# Patient Record
Sex: MALE | Race: WHITE | URBAN - METROPOLITAN AREA
[De-identification: names, ages, dates, MRNs, and addresses within clinical notes are randomized per-mention and may not be internally consistent; named-entity substitution may affect disease eponyms.]

---

## 2017-09-19 ENCOUNTER — APPOINTMENT (RX ONLY)
Dept: URBAN - METROPOLITAN AREA CLINIC 344 | Facility: CLINIC | Age: 72
Setting detail: DERMATOLOGY
End: 2017-09-19

## 2017-09-19 DIAGNOSIS — B35.3 TINEA PEDIS: ICD-10-CM

## 2017-09-19 PROCEDURE — ? TREATMENT REGIMEN

## 2017-09-19 PROCEDURE — 99202 OFFICE O/P NEW SF 15 MIN: CPT

## 2017-09-19 PROCEDURE — ? COUNSELING

## 2017-09-19 ASSESSMENT — LOCATION SIMPLE DESCRIPTION DERM
LOCATION SIMPLE: RIGHT FOOT
LOCATION SIMPLE: RIGHT PLANTAR SURFACE
LOCATION SIMPLE: LEFT FOOT

## 2017-09-19 ASSESSMENT — LOCATION DETAILED DESCRIPTION DERM
LOCATION DETAILED: RIGHT INSTEP
LOCATION DETAILED: LEFT DORSAL FOOT
LOCATION DETAILED: RIGHT DORSAL FOOT

## 2017-09-19 ASSESSMENT — LOCATION ZONE DERM: LOCATION ZONE: FEET

## 2017-09-19 NOTE — PROCEDURE: TREATMENT REGIMEN
Notification: Please note that there are new Treatment Regimen plans which have an enhanced patient education handout experience.  The plans are called Treatment Regimen (Acne), Treatment Regimen (Atopic Dermatitis), Treatment Regimen (Rosacea), Treatment Regimen (Sun Protection), Treatment Regimen (Cosmetic Products), and Treatment Regimen (Xerosis).
Initiate Treatment: Naftin gel: apply to affected areas twice daily.  Apply liberal amount to feet and wrap at bedtime.  Patient has Rx at home
Continue Regimen: Urea 45% cream: apply to feet once daily, or as needed
Detail Level: Zone

## 2017-10-03 ENCOUNTER — APPOINTMENT (RX ONLY)
Dept: URBAN - METROPOLITAN AREA CLINIC 344 | Facility: CLINIC | Age: 72
Setting detail: DERMATOLOGY
End: 2017-10-03

## 2017-10-03 DIAGNOSIS — B35.3 TINEA PEDIS: ICD-10-CM | Status: IMPROVED

## 2017-10-03 DIAGNOSIS — B35.4 TINEA CORPORIS: ICD-10-CM

## 2017-10-03 PROCEDURE — ? COUNSELING

## 2017-10-03 PROCEDURE — 99213 OFFICE O/P EST LOW 20 MIN: CPT

## 2017-10-03 PROCEDURE — ? TREATMENT REGIMEN

## 2017-10-03 ASSESSMENT — LOCATION ZONE DERM
LOCATION ZONE: FEET
LOCATION ZONE: LEG

## 2017-10-03 ASSESSMENT — LOCATION DETAILED DESCRIPTION DERM
LOCATION DETAILED: RIGHT ANTERIOR PROXIMAL THIGH
LOCATION DETAILED: RIGHT INSTEP
LOCATION DETAILED: RIGHT DORSAL FOOT
LOCATION DETAILED: LEFT DORSAL FOOT

## 2017-10-03 ASSESSMENT — LOCATION SIMPLE DESCRIPTION DERM
LOCATION SIMPLE: RIGHT PLANTAR SURFACE
LOCATION SIMPLE: LEFT FOOT
LOCATION SIMPLE: RIGHT THIGH
LOCATION SIMPLE: RIGHT FOOT

## 2017-10-03 NOTE — PROCEDURE: TREATMENT REGIMEN
Detail Level: Zone
Initiate Treatment: Naftin gel: apply to affected areas twice daily
Notification: Please note that there are new Treatment Regimen plans which have an enhanced patient education handout experience.  The plans are called Treatment Regimen (Acne), Treatment Regimen (Atopic Dermatitis), Treatment Regimen (Rosacea), Treatment Regimen (Sun Protection), Treatment Regimen (Cosmetic Products), and Treatment Regimen (Xerosis).
Continue Regimen: Naftin gel: apply to affected areas twice daily.  Apply liberal amount to feet and wrap at bedtime.  \\nUrea 45% cream: apply to feet once daily, or as needed

## 2018-09-18 ENCOUNTER — RX ONLY (OUTPATIENT)
Age: 73
Setting detail: RX ONLY
End: 2018-09-18

## 2018-09-18 RX ORDER — UREA 40 G/100G
CREAM TOPICAL
Qty: 1 | Refills: 0 | Status: ERX

## 2019-03-19 ENCOUNTER — RX ONLY (OUTPATIENT)
Age: 74
Setting detail: RX ONLY
End: 2019-03-19

## 2019-03-19 ENCOUNTER — APPOINTMENT (RX ONLY)
Dept: URBAN - METROPOLITAN AREA CLINIC 344 | Facility: CLINIC | Age: 74
Setting detail: DERMATOLOGY
End: 2019-03-19

## 2019-03-19 DIAGNOSIS — L82.1 OTHER SEBORRHEIC KERATOSIS: ICD-10-CM

## 2019-03-19 DIAGNOSIS — L81.4 OTHER MELANIN HYPERPIGMENTATION: ICD-10-CM

## 2019-03-19 DIAGNOSIS — D18.0 HEMANGIOMA: ICD-10-CM

## 2019-03-19 DIAGNOSIS — B35.3 TINEA PEDIS: ICD-10-CM | Status: WELL CONTROLLED

## 2019-03-19 DIAGNOSIS — D22 MELANOCYTIC NEVI: ICD-10-CM

## 2019-03-19 DIAGNOSIS — L85.3 XEROSIS CUTIS: ICD-10-CM

## 2019-03-19 PROBLEM — K21.9 GASTRO-ESOPHAGEAL REFLUX DISEASE WITHOUT ESOPHAGITIS: Status: ACTIVE | Noted: 2019-03-19

## 2019-03-19 PROBLEM — D22.5 MELANOCYTIC NEVI OF TRUNK: Status: ACTIVE | Noted: 2019-03-19

## 2019-03-19 PROBLEM — J45.909 UNSPECIFIED ASTHMA, UNCOMPLICATED: Status: ACTIVE | Noted: 2019-03-19

## 2019-03-19 PROBLEM — D18.01 HEMANGIOMA OF SKIN AND SUBCUTANEOUS TISSUE: Status: ACTIVE | Noted: 2019-03-19

## 2019-03-19 PROCEDURE — ? OTHER

## 2019-03-19 PROCEDURE — 99213 OFFICE O/P EST LOW 20 MIN: CPT

## 2019-03-19 PROCEDURE — ? ADDITIONAL NOTES

## 2019-03-19 PROCEDURE — ? TREATMENT REGIMEN

## 2019-03-19 PROCEDURE — ? COUNSELING

## 2019-03-19 RX ORDER — NAFTIFINE HYDROCHLORIDE 2 G/100G
GEL TOPICAL
Qty: 1 | Refills: 1 | Status: ERX

## 2019-03-19 ASSESSMENT — LOCATION ZONE DERM
LOCATION ZONE: TRUNK
LOCATION ZONE: FEET

## 2019-03-19 ASSESSMENT — LOCATION DETAILED DESCRIPTION DERM
LOCATION DETAILED: RIGHT INFERIOR UPPER BACK
LOCATION DETAILED: LEFT PLANTAR FOREFOOT OVERLYING 3RD METATARSAL
LOCATION DETAILED: RIGHT SUPERIOR MEDIAL LOWER BACK
LOCATION DETAILED: RIGHT PLANTAR FOREFOOT OVERLYING 3RD METATARSAL
LOCATION DETAILED: RIGHT INFERIOR MEDIAL MIDBACK
LOCATION DETAILED: RIGHT MID-UPPER BACK

## 2019-03-19 ASSESSMENT — LOCATION SIMPLE DESCRIPTION DERM
LOCATION SIMPLE: RIGHT PLANTAR SURFACE
LOCATION SIMPLE: RIGHT LOWER BACK
LOCATION SIMPLE: RIGHT UPPER BACK
LOCATION SIMPLE: LEFT PLANTAR SURFACE

## 2019-03-19 NOTE — PROCEDURE: TREATMENT REGIMEN
Discontinue Regimen: Dial soap
Detail Level: Zone
Continue Regimen: Naftin cream and Cerave foot and heel cream
Plan: [place] cooler showers\\nGentle soaps for washing
Detail Level: Generalized
Samples Given: Moisturize daily \\nEucerine body cream and wash recommended

## 2019-03-19 NOTE — PROCEDURE: OTHER
Detail Level: Simple
Note Text (......Xxx Chief Complaint.): This diagnosis correlates with the
Other (Free Text): Right cheek , reassurance given

## 2019-12-23 ENCOUNTER — APPOINTMENT (RX ONLY)
Dept: URBAN - METROPOLITAN AREA CLINIC 344 | Facility: CLINIC | Age: 74
Setting detail: DERMATOLOGY
End: 2019-12-23

## 2019-12-23 DIAGNOSIS — B35.4 TINEA CORPORIS: ICD-10-CM

## 2019-12-23 DIAGNOSIS — L82.1 OTHER SEBORRHEIC KERATOSIS: ICD-10-CM

## 2019-12-23 DIAGNOSIS — L24 IRRITANT CONTACT DERMATITIS: ICD-10-CM

## 2019-12-23 PROBLEM — L24.9 IRRITANT CONTACT DERMATITIS, UNSPECIFIED CAUSE: Status: ACTIVE | Noted: 2019-12-23

## 2019-12-23 PROCEDURE — ? TREATMENT REGIMEN

## 2019-12-23 PROCEDURE — ? PRESCRIPTION

## 2019-12-23 PROCEDURE — ? COUNSELING

## 2019-12-23 PROCEDURE — 99213 OFFICE O/P EST LOW 20 MIN: CPT

## 2019-12-23 PROCEDURE — ? DEFER

## 2019-12-23 RX ORDER — CLOTRIMAZOLE AND BETAMETHASONE DIPROPIONATE 10; .5 MG/G; MG/G
CREAM TOPICAL
Qty: 1 | Refills: 6 | Status: ERX | COMMUNITY
Start: 2019-12-23

## 2019-12-23 RX ORDER — TRIAMCINOLONE ACETONIDE 1 MG/G
CREAM TOPICAL
Qty: 1 | Refills: 2 | Status: ERX | COMMUNITY
Start: 2019-12-23

## 2019-12-23 RX ADMIN — TRIAMCINOLONE ACETONIDE: 1 CREAM TOPICAL at 00:00

## 2019-12-23 RX ADMIN — CLOTRIMAZOLE AND BETAMETHASONE DIPROPIONATE: 10; .5 CREAM TOPICAL at 00:00

## 2019-12-23 ASSESSMENT — LOCATION SIMPLE DESCRIPTION DERM
LOCATION SIMPLE: RIGHT FOREARM
LOCATION SIMPLE: GROIN
LOCATION SIMPLE: RIGHT INGUINAL FOLD
LOCATION SIMPLE: LEFT FOREARM

## 2019-12-23 ASSESSMENT — LOCATION DETAILED DESCRIPTION DERM
LOCATION DETAILED: RIGHT PROXIMAL DORSAL FOREARM
LOCATION DETAILED: LEFT SUPRAPUBIC SKIN
LOCATION DETAILED: LEFT PROXIMAL DORSAL FOREARM
LOCATION DETAILED: RIGHT INGUINAL FOLD
LOCATION DETAILED: RIGHT SUPRAPUBIC SKIN

## 2019-12-23 ASSESSMENT — LOCATION ZONE DERM
LOCATION ZONE: ARM
LOCATION ZONE: LEG
LOCATION ZONE: TRUNK

## 2019-12-23 NOTE — PROCEDURE: TREATMENT REGIMEN
Detail Level: Zone
Continue Regimen: Clobetasol diproprinate - apply to affected areas every morning
Initiate Treatment: Triamcinolone cream- apply twice daily for two weeks \\nFragrance free detergents \\nSensitive skin lotions
Initiate Treatment: Triple paste- Apple to affected areas at night

## 2019-12-23 NOTE — PROCEDURE: DEFER
Introduction Text (Please End With A Colon): The following procedure was deferred: topical field treatment
Detail Level: Detailed

## 2020-01-29 ENCOUNTER — APPOINTMENT (RX ONLY)
Dept: URBAN - METROPOLITAN AREA CLINIC 347 | Facility: CLINIC | Age: 75
Setting detail: DERMATOLOGY
End: 2020-01-29

## 2020-01-29 DIAGNOSIS — L82.0 INFLAMED SEBORRHEIC KERATOSIS: ICD-10-CM

## 2020-01-29 DIAGNOSIS — B35.4 TINEA CORPORIS: ICD-10-CM | Status: IMPROVED

## 2020-01-29 DIAGNOSIS — B35.3 TINEA PEDIS: ICD-10-CM | Status: IMPROVED

## 2020-01-29 PROBLEM — I10 ESSENTIAL (PRIMARY) HYPERTENSION: Status: ACTIVE | Noted: 2020-01-29

## 2020-01-29 PROCEDURE — 17110 DESTRUCTION B9 LES UP TO 14: CPT

## 2020-01-29 PROCEDURE — ? LIQUID NITROGEN

## 2020-01-29 PROCEDURE — ? TREATMENT REGIMEN

## 2020-01-29 PROCEDURE — ? PRESCRIPTION

## 2020-01-29 PROCEDURE — ? COUNSELING

## 2020-01-29 PROCEDURE — 99213 OFFICE O/P EST LOW 20 MIN: CPT | Mod: 25

## 2020-01-29 RX ORDER — NAFTIFINE HYDROCHLORIDE 2 G/100G
GEL TOPICAL
Qty: 1 | Refills: 3 | Status: ERX | COMMUNITY
Start: 2020-01-29

## 2020-01-29 RX ADMIN — NAFTIFINE HYDROCHLORIDE: 2 GEL TOPICAL at 00:00

## 2020-01-29 ASSESSMENT — LOCATION ZONE DERM
LOCATION ZONE: FEET
LOCATION ZONE: LEG
LOCATION ZONE: TRUNK

## 2020-01-29 ASSESSMENT — LOCATION SIMPLE DESCRIPTION DERM
LOCATION SIMPLE: LEFT THIGH
LOCATION SIMPLE: RIGHT FOOT
LOCATION SIMPLE: LEFT FOOT
LOCATION SIMPLE: GROIN

## 2020-01-29 ASSESSMENT — LOCATION DETAILED DESCRIPTION DERM
LOCATION DETAILED: LEFT DORSAL FOOT
LOCATION DETAILED: SUPRAPUBIC SKIN
LOCATION DETAILED: LEFT ANTERIOR MEDIAL PROXIMAL THIGH
LOCATION DETAILED: RIGHT DORSAL FOOT

## 2020-01-29 NOTE — PROCEDURE: TREATMENT REGIMEN
Detail Level: Zone
Continue Regimen: Naftin gel each night
Continue Regimen: Clotrimazole - Betamethasone daily as needed for flares

## 2020-08-14 ENCOUNTER — APPOINTMENT (RX ONLY)
Dept: URBAN - METROPOLITAN AREA CLINIC 347 | Facility: CLINIC | Age: 75
Setting detail: DERMATOLOGY
End: 2020-08-14

## 2020-08-14 DIAGNOSIS — L82.1 OTHER SEBORRHEIC KERATOSIS: ICD-10-CM

## 2020-08-14 DIAGNOSIS — B35.4 TINEA CORPORIS: ICD-10-CM | Status: WELL CONTROLLED

## 2020-08-14 PROCEDURE — 99213 OFFICE O/P EST LOW 20 MIN: CPT

## 2020-08-14 PROCEDURE — ? COUNSELING

## 2020-08-14 PROCEDURE — ? TREATMENT REGIMEN

## 2020-08-14 ASSESSMENT — LOCATION DETAILED DESCRIPTION DERM
LOCATION DETAILED: LEFT DISTAL POSTERIOR UPPER ARM
LOCATION DETAILED: LEFT SUPERIOR MEDIAL MALAR CHEEK

## 2020-08-14 ASSESSMENT — LOCATION SIMPLE DESCRIPTION DERM
LOCATION SIMPLE: LEFT UPPER ARM
LOCATION SIMPLE: LEFT CHEEK

## 2020-08-14 ASSESSMENT — LOCATION ZONE DERM
LOCATION ZONE: FACE
LOCATION ZONE: ARM

## 2020-08-14 NOTE — PROCEDURE: TREATMENT REGIMEN
Continue Regimen: Zeasorb powder every morning \\nTripple paste AF at night \\nClotrimazole / Betamethasone only as needed for flares
Detail Level: Zone

## 2021-03-03 ENCOUNTER — APPOINTMENT (RX ONLY)
Dept: URBAN - METROPOLITAN AREA CLINIC 347 | Facility: CLINIC | Age: 76
Setting detail: DERMATOLOGY
End: 2021-03-03

## 2021-03-03 DIAGNOSIS — L20.89 OTHER ATOPIC DERMATITIS: ICD-10-CM

## 2021-03-03 DIAGNOSIS — B35.4 TINEA CORPORIS: ICD-10-CM | Status: STABLE

## 2021-03-03 PROBLEM — L20.84 INTRINSIC (ALLERGIC) ECZEMA: Status: ACTIVE | Noted: 2021-03-03

## 2021-03-03 PROBLEM — L85.3 XEROSIS CUTIS: Status: ACTIVE | Noted: 2021-03-03

## 2021-03-03 PROBLEM — F32.9 MAJOR DEPRESSIVE DISORDER, SINGLE EPISODE, UNSPECIFIED: Status: ACTIVE | Noted: 2021-03-03

## 2021-03-03 PROCEDURE — ? TREATMENT REGIMEN

## 2021-03-03 PROCEDURE — ? COUNSELING

## 2021-03-03 PROCEDURE — 99213 OFFICE O/P EST LOW 20 MIN: CPT

## 2021-03-03 ASSESSMENT — LOCATION SIMPLE DESCRIPTION DERM: LOCATION SIMPLE: RIGHT LOWER BACK

## 2021-03-03 ASSESSMENT — LOCATION ZONE DERM: LOCATION ZONE: TRUNK

## 2021-03-03 ASSESSMENT — LOCATION DETAILED DESCRIPTION DERM: LOCATION DETAILED: RIGHT SUPERIOR MEDIAL LOWER BACK

## 2021-03-03 NOTE — PROCEDURE: TREATMENT REGIMEN
Detail Level: Zone
Continue Regimen: Zeasorb powder every morning \\nTripple paste AF at night \\nClotrimazole / Betamethasone only as needed for flares
Initiate Treatment: Triamcinolone (Pt has at home) - apply to affected area of back twice daily for two weeks

## 2024-09-27 ENCOUNTER — APPOINTMENT (RX ONLY)
Dept: URBAN - METROPOLITAN AREA CLINIC 340 | Facility: CLINIC | Age: 79
Setting detail: DERMATOLOGY
End: 2024-09-27

## 2024-09-27 DIAGNOSIS — D22 MELANOCYTIC NEVI: ICD-10-CM

## 2024-09-27 DIAGNOSIS — D18.0 HEMANGIOMA: ICD-10-CM

## 2024-09-27 DIAGNOSIS — L81.4 OTHER MELANIN HYPERPIGMENTATION: ICD-10-CM

## 2024-09-27 DIAGNOSIS — L73.8 OTHER SPECIFIED FOLLICULAR DISORDERS: ICD-10-CM | Status: STABLE

## 2024-09-27 DIAGNOSIS — R23.3 SPONTANEOUS ECCHYMOSES: ICD-10-CM | Status: STABLE

## 2024-09-27 DIAGNOSIS — L82.1 OTHER SEBORRHEIC KERATOSIS: ICD-10-CM

## 2024-09-27 DIAGNOSIS — L82.0 INFLAMED SEBORRHEIC KERATOSIS: ICD-10-CM | Status: INADEQUATELY CONTROLLED

## 2024-09-27 DIAGNOSIS — R20.2 PARESTHESIA OF SKIN: ICD-10-CM | Status: STABLE

## 2024-09-27 DIAGNOSIS — L89 PRESSURE ULCER: ICD-10-CM | Status: INADEQUATELY CONTROLLED

## 2024-09-27 PROBLEM — D22.5 MELANOCYTIC NEVI OF TRUNK: Status: ACTIVE | Noted: 2024-09-27

## 2024-09-27 PROBLEM — L89.109 PRESSURE ULCER OF UNSPECIFIED PART OF BACK, UNSPECIFIED STAGE: Status: ACTIVE | Noted: 2024-09-27

## 2024-09-27 PROBLEM — D18.01 HEMANGIOMA OF SKIN AND SUBCUTANEOUS TISSUE: Status: ACTIVE | Noted: 2024-09-27

## 2024-09-27 PROCEDURE — ? COUNSELING

## 2024-09-27 PROCEDURE — ? PRESCRIPTION MEDICATION MANAGEMENT

## 2024-09-27 PROCEDURE — ? LIQUID NITROGEN

## 2024-09-27 PROCEDURE — ? OTC TREATMENT REGIMEN

## 2024-09-27 PROCEDURE — ? MEDICARE ABN

## 2024-09-27 PROCEDURE — 99203 OFFICE O/P NEW LOW 30 MIN: CPT | Mod: 25

## 2024-09-27 PROCEDURE — 17110 DESTRUCTION B9 LES UP TO 14: CPT

## 2024-09-27 PROCEDURE — ? FULL BODY SKIN EXAM

## 2024-09-27 ASSESSMENT — LOCATION DETAILED DESCRIPTION DERM
LOCATION DETAILED: EPIGASTRIC SKIN
LOCATION DETAILED: LEFT CENTRAL MALAR CHEEK
LOCATION DETAILED: SUPERIOR MID FOREHEAD
LOCATION DETAILED: MID TRAPEZIAL NECK
LOCATION DETAILED: PERIUMBILICAL SKIN
LOCATION DETAILED: LEFT ULNAR DORSAL HAND
LOCATION DETAILED: LEFT RIB CAGE
LOCATION DETAILED: RIGHT RADIAL DORSAL HAND
LOCATION DETAILED: SUPERIOR LUMBAR SPINE
LOCATION DETAILED: LEFT SUPERIOR MEDIAL MALAR CHEEK

## 2024-09-27 ASSESSMENT — LOCATION SIMPLE DESCRIPTION DERM
LOCATION SIMPLE: ABDOMEN
LOCATION SIMPLE: RIGHT HAND
LOCATION SIMPLE: TRAPEZIAL NECK
LOCATION SIMPLE: LOWER BACK
LOCATION SIMPLE: LEFT CHEEK
LOCATION SIMPLE: SUPERIOR FOREHEAD
LOCATION SIMPLE: LEFT HAND

## 2024-09-27 ASSESSMENT — LOCATION ZONE DERM
LOCATION ZONE: FACE
LOCATION ZONE: TRUNK
LOCATION ZONE: NECK
LOCATION ZONE: HAND

## 2024-09-27 NOTE — PROCEDURE: LIQUID NITROGEN
Spray Paint Technique: No
Render Post-Care Instructions In Note?: yes
Spray Paint Text: The liquid nitrogen was applied to the skin utilizing a spray paint frosting technique.
Post-Care Instructions: I reviewed with the patient in detail post-care instructions. Patient is to wear sunprotection, and avoid picking at any of the treated lesions. Pt may apply Vaseline to crusted or scabbing areas.
Detail Level: Detailed
Consent: The patient's VERBAL consent was obtained including but not limited to risks of crusting, scabbing, blistering, scarring, darker or lighter pigmentary change, recurrence, incomplete removal and infection.
Medical Necessity Information: It is in your best interest to select a reason for this procedure from the list below. All of these items fulfill various CMS LCD requirements except the new and changing color options.
Medical Necessity Clause: This procedure was medically necessary because the lesions that were treated were:

## 2024-09-27 NOTE — PROCEDURE: PRESCRIPTION MEDICATION MANAGEMENT
Render In Strict Bullet Format?: No
Plan: Pt can call for Pramoxine cream prescription if itching intensifies or rash becomes present.
Detail Level: Zone

## 2024-09-27 NOTE — HPI: EVALUATION OF SKIN LESION(S)
What Type Of Note Output Would You Prefer (Optional)?: Bullet Format
Hpi Title: Evaluation of Skin Lesions
How Severe Are Your Spot(S)?: mild
Have Your Spot(S) Been Treated In The Past?: has not been treated
Additional History: Spots on chest and back

## 2024-09-27 NOTE — PROCEDURE: OTC TREATMENT REGIMEN
Detail Level: Zone
Patient Specific Otc Recommendations (Will Not Stick From Patient To Patient): Arnica cream daily to improve pigmentation.\\nTake Vitamin E daily.
Patient Specific Otc Recommendations (Will Not Stick From Patient To Patient): Advised pt when sitting to rearrange his positioning more frequently to decrease the progression of ulcer.
Patient Specific Otc Recommendations (Will Not Stick From Patient To Patient): Cerave anti itch lotion- apply to affected area of the back/neck daily for itching
Samples Given: Cerave anti itch lotion